# Patient Record
Sex: FEMALE | URBAN - METROPOLITAN AREA
[De-identification: names, ages, dates, MRNs, and addresses within clinical notes are randomized per-mention and may not be internally consistent; named-entity substitution may affect disease eponyms.]

---

## 2018-04-24 ENCOUNTER — NEW PATIENT (OUTPATIENT)
Dept: URBAN - METROPOLITAN AREA CLINIC 30 | Facility: CLINIC | Age: 68
End: 2018-04-24
Payer: COMMERCIAL

## 2018-04-24 PROCEDURE — 76514 ECHO EXAM OF EYE THICKNESS: CPT | Performed by: OPTOMETRIST

## 2018-04-24 PROCEDURE — 92004 COMPRE OPH EXAM NEW PT 1/>: CPT | Performed by: OPTOMETRIST

## 2018-04-24 PROCEDURE — 92133 CPTRZD OPH DX IMG PST SGM ON: CPT | Performed by: OPTOMETRIST

## 2018-04-24 ASSESSMENT — VISUAL ACUITY
OS: 20/30
OD: 20/25

## 2018-04-24 ASSESSMENT — INTRAOCULAR PRESSURE
OD: 14
OS: 14

## 2018-04-24 ASSESSMENT — KERATOMETRY
OD: 44.59
OS: 44.27

## 2018-05-08 ENCOUNTER — Encounter (OUTPATIENT)
Dept: URBAN - METROPOLITAN AREA CLINIC 30 | Facility: CLINIC | Age: 68
End: 2018-05-08
Payer: COMMERCIAL

## 2018-05-08 DIAGNOSIS — Z01.818 ENCOUNTER FOR OTHER PREPROCEDURAL EXAMINATION: Primary | ICD-10-CM

## 2018-05-08 DIAGNOSIS — H25.813 COMBINED FORMS OF AGE-RELATED CATARACT, BILATERAL: ICD-10-CM

## 2018-05-08 PROCEDURE — 99213 OFFICE O/P EST LOW 20 MIN: CPT | Performed by: NURSE PRACTITIONER

## 2018-05-08 RX ORDER — LEVOTHYROXINE SODIUM 25 UG/1
TABLET ORAL
Qty: 0 | Refills: 0 | Status: INACTIVE
Start: 2018-05-08 | End: 2018-05-25

## 2018-05-08 RX ORDER — ATORVASTATIN CALCIUM 10 MG/1
10 MG TABLET, FILM COATED ORAL
Qty: 0 | Refills: 0 | Status: INACTIVE
Start: 2018-05-08 | End: 2018-05-25

## 2018-05-08 RX ORDER — OLMESARTAN MEDOXOMIL 20 MG/1
20 MG TABLET, FILM COATED ORAL
Qty: 0 | Refills: 0 | Status: INACTIVE
Start: 2018-05-08 | End: 2018-05-25

## 2018-05-08 RX ORDER — IBUPROFEN 800 MG
TABLET ORAL
Qty: 0 | Refills: 0 | Status: INACTIVE
Start: 2018-05-08 | End: 2018-05-25

## 2018-05-10 ENCOUNTER — FOLLOW UP ESTABLISHED (OUTPATIENT)
Dept: URBAN - METROPOLITAN AREA CLINIC 10 | Facility: CLINIC | Age: 68
End: 2018-05-10
Payer: COMMERCIAL

## 2018-05-10 DIAGNOSIS — H25.12 AGE-RELATED NUCLEAR CATARACT, LEFT EYE: Primary | ICD-10-CM

## 2018-05-10 PROCEDURE — 92012 INTRM OPH EXAM EST PATIENT: CPT | Performed by: OPHTHALMOLOGY

## 2018-05-10 ASSESSMENT — INTRAOCULAR PRESSURE
OS: 16
OD: 16

## 2018-05-24 ENCOUNTER — SURGERY (OUTPATIENT)
Dept: URBAN - METROPOLITAN AREA SURGERY 5 | Facility: SURGERY | Age: 68
End: 2018-05-24
Payer: COMMERCIAL

## 2018-05-24 PROCEDURE — 66984 XCAPSL CTRC RMVL W/O ECP: CPT | Performed by: OPHTHALMOLOGY

## 2018-05-25 ENCOUNTER — POST OP (OUTPATIENT)
Dept: URBAN - METROPOLITAN AREA CLINIC 10 | Facility: CLINIC | Age: 68
End: 2018-05-25

## 2018-05-25 PROCEDURE — 99024 POSTOP FOLLOW-UP VISIT: CPT | Performed by: OPTOMETRIST

## 2018-05-25 ASSESSMENT — INTRAOCULAR PRESSURE: OS: 19

## 2018-06-06 ENCOUNTER — POST OP (OUTPATIENT)
Dept: URBAN - METROPOLITAN AREA CLINIC 30 | Facility: CLINIC | Age: 68
End: 2018-06-06

## 2018-06-06 PROCEDURE — 99024 POSTOP FOLLOW-UP VISIT: CPT | Performed by: OPTOMETRIST

## 2018-06-06 ASSESSMENT — VISUAL ACUITY
OS: 20/25
OD: 20/30

## 2018-06-06 ASSESSMENT — INTRAOCULAR PRESSURE
OD: 17
OS: 16

## 2018-06-12 ENCOUNTER — Encounter (OUTPATIENT)
Dept: URBAN - METROPOLITAN AREA CLINIC 30 | Facility: CLINIC | Age: 68
End: 2018-06-12
Payer: COMMERCIAL

## 2018-06-12 DIAGNOSIS — H25.11 AGE-RELATED NUCLEAR CATARACT, RIGHT EYE: ICD-10-CM

## 2018-06-12 PROCEDURE — 99213 OFFICE O/P EST LOW 20 MIN: CPT | Performed by: PHYSICIAN ASSISTANT

## 2018-06-21 ENCOUNTER — SURGERY (OUTPATIENT)
Dept: URBAN - METROPOLITAN AREA SURGERY 5 | Facility: SURGERY | Age: 68
End: 2018-06-21
Payer: COMMERCIAL

## 2018-06-21 PROCEDURE — 66984 XCAPSL CTRC RMVL W/O ECP: CPT | Performed by: OPHTHALMOLOGY

## 2018-06-22 ENCOUNTER — POST OP (OUTPATIENT)
Dept: URBAN - METROPOLITAN AREA CLINIC 30 | Facility: CLINIC | Age: 68
End: 2018-06-22

## 2018-06-22 PROCEDURE — 99024 POSTOP FOLLOW-UP VISIT: CPT | Performed by: OPTOMETRIST

## 2018-06-22 ASSESSMENT — INTRAOCULAR PRESSURE
OD: 12
OS: 13

## 2018-07-24 ENCOUNTER — POST OP (OUTPATIENT)
Dept: URBAN - METROPOLITAN AREA CLINIC 30 | Facility: CLINIC | Age: 68
End: 2018-07-24

## 2018-07-24 DIAGNOSIS — Z96.1 PRESENCE OF INTRAOCULAR LENS: Primary | ICD-10-CM

## 2018-07-24 PROCEDURE — 99024 POSTOP FOLLOW-UP VISIT: CPT | Performed by: OPTOMETRIST

## 2018-07-24 ASSESSMENT — INTRAOCULAR PRESSURE
OS: 15
OD: 15

## 2018-07-24 ASSESSMENT — VISUAL ACUITY
OD: 20/25
OS: 20/25

## 2021-08-12 ENCOUNTER — OFFICE VISIT (OUTPATIENT)
Dept: URBAN - METROPOLITAN AREA CLINIC 30 | Facility: CLINIC | Age: 71
End: 2021-08-12
Payer: COMMERCIAL

## 2021-08-12 DIAGNOSIS — H40.2234 CHRONIC ANGLE CLOSURE, BILATERAL, INDETERMINATE STAGE: ICD-10-CM

## 2021-08-12 DIAGNOSIS — H43.813 VITREOUS DEGENERATION, BILATERAL: ICD-10-CM

## 2021-08-12 PROCEDURE — 99203 OFFICE O/P NEW LOW 30 MIN: CPT | Performed by: OPTOMETRIST

## 2021-08-12 PROCEDURE — 92134 CPTRZ OPH DX IMG PST SGM RTA: CPT | Performed by: OPTOMETRIST

## 2021-08-12 ASSESSMENT — INTRAOCULAR PRESSURE
OS: 16
OD: 15

## 2021-08-12 ASSESSMENT — KERATOMETRY
OD: 44.49
OS: 44.17

## 2021-08-12 ASSESSMENT — VISUAL ACUITY
OS: 20/20
OD: 20/20

## 2021-08-12 NOTE — IMPRESSION/PLAN
Impression: Presence of intraocular lens: Z96.1. Plan: status quo, pt is happy with vision and using otc readers.

## 2021-08-12 NOTE — IMPRESSION/PLAN
Impression: Chronic angle closure, bilateral, indeterminate stage Pachs 418/370 Plan: s/p LPI OU 2017 in [facility]. C/ds difficult to grade. Now s/p cataract surgery angles are open. RNFL OCT 4/18 WNL OU. NO tx indicated, IOPs stable.